# Patient Record
Sex: MALE | Race: OTHER | URBAN - METROPOLITAN AREA
[De-identification: names, ages, dates, MRNs, and addresses within clinical notes are randomized per-mention and may not be internally consistent; named-entity substitution may affect disease eponyms.]

---

## 2021-02-19 ENCOUNTER — OFFICE VISIT (OUTPATIENT)
Dept: URBAN - METROPOLITAN AREA CLINIC 25 | Facility: CLINIC | Age: 36
End: 2021-02-19
Payer: COMMERCIAL

## 2021-02-19 ENCOUNTER — WEB ENCOUNTER (OUTPATIENT)
Dept: URBAN - METROPOLITAN AREA CLINIC 25 | Facility: CLINIC | Age: 36
End: 2021-02-19

## 2021-02-19 ENCOUNTER — LAB OUTSIDE AN ENCOUNTER (OUTPATIENT)
Dept: URBAN - METROPOLITAN AREA CLINIC 25 | Facility: CLINIC | Age: 36
End: 2021-02-19

## 2021-02-19 VITALS
TEMPERATURE: 96.4 F | SYSTOLIC BLOOD PRESSURE: 106 MMHG | DIASTOLIC BLOOD PRESSURE: 71 MMHG | HEIGHT: 66 IN | HEART RATE: 69 BPM | BODY MASS INDEX: 22.28 KG/M2 | WEIGHT: 138.6 LBS

## 2021-02-19 DIAGNOSIS — R10.13 DYSPEPSIA: ICD-10-CM

## 2021-02-19 DIAGNOSIS — R10.10 UPPER ABDOMINAL PAIN: ICD-10-CM

## 2021-02-19 PROCEDURE — 99204 OFFICE O/P NEW MOD 45 MIN: CPT | Performed by: INTERNAL MEDICINE

## 2021-02-19 PROCEDURE — G9903 PT SCRN TBCO ID AS NON USER: HCPCS | Performed by: INTERNAL MEDICINE

## 2021-02-19 PROCEDURE — G8483 FLU IMM NO ADMIN DOC REA: HCPCS | Performed by: INTERNAL MEDICINE

## 2021-02-19 PROCEDURE — G8427 DOCREV CUR MEDS BY ELIG CLIN: HCPCS | Performed by: INTERNAL MEDICINE

## 2021-02-19 PROCEDURE — G8420 CALC BMI NORM PARAMETERS: HCPCS | Performed by: INTERNAL MEDICINE

## 2021-02-19 NOTE — HPI-TODAY'S VISIT:
feb 2021:   Chronic gerd. symptoms started at age 17. has seen GI before.EGD in 2013 - gastritis.  EGD bravo 2013 - normal demeester score ( done on PPI)  between 5 am and 8 am - lower chest feels tigher, upper abdominal discomfort. better with sitting up. unable to burp. symptoms are mostly when reclinign flat but not when upright.   not using PPI ast present. no heartburn. no dysphagia. stopped PPI because there was no improvement in symptoms.  labs were done with pcp yesterday.

## 2021-02-19 NOTE — HPI-OTHER HISTORIES
2013: dr suero   This is a 28-year-old male without any past medical history who I previously  saw for atypical chest pain.  Patient previously had cardiac and pulmonary workup without any  identifiable etiology.  Patient underwent EGD with Bravo pH probe that showed a pH less than 4  of 4.9 and DeMeester score of 21.4 consistent with reflux disease.  There was a high correlation  of symptom index of chest pain of 75%.  Patient has tried Zantac, Prilosec, Prevacid, and most  recently Dexilant.  With 1 month therapy with Dexilant 60 mg daily, he states that he has noticed  no significant improvement of symptoms.  He continues to experience chest pain characterized as  a pressure pain, which is exacerbated with lying down with occasional burping.  He denies any  shortness of breath, odynophagia, dysphagia or regurgitation.  He has noticed occasional reflux  symptoms which are well controlled on PPI.  Patient reports having regular bowel movement  without any hematemesis, melena, or hematochezia.  Patient reports no fever, chills,  constitutional symptoms of weight loss.

## 2021-02-26 ENCOUNTER — LAB OUTSIDE AN ENCOUNTER (OUTPATIENT)
Dept: URBAN - METROPOLITAN AREA CLINIC 25 | Facility: CLINIC | Age: 36
End: 2021-02-26

## 2021-03-04 ENCOUNTER — TELEPHONE ENCOUNTER (OUTPATIENT)
Dept: URBAN - METROPOLITAN AREA CLINIC 25 | Facility: CLINIC | Age: 36
End: 2021-03-04

## 2021-03-04 RX ORDER — HYOSCYAMINE SULFATE 0.125 MG
1 TABLET AS NEEDED TABLET,DISINTEGRATING ORAL
Qty: 100 | Refills: 0 | OUTPATIENT
Start: 2021-03-04 | End: 2021-04-03

## 2021-03-04 RX ORDER — HYOSCYAMINE SULFATE 0.12 MG/1
1 TABLET AS NEEDED TABLET ORAL
Qty: 100 | Refills: 0 | OUTPATIENT
Start: 2021-03-04 | End: 2021-04-03

## 2021-03-15 ENCOUNTER — OFFICE VISIT (OUTPATIENT)
Dept: URBAN - METROPOLITAN AREA TELEHEALTH 2 | Facility: TELEHEALTH | Age: 36
End: 2021-03-15
Payer: COMMERCIAL

## 2021-03-15 ENCOUNTER — TELEPHONE ENCOUNTER (OUTPATIENT)
Dept: URBAN - METROPOLITAN AREA CLINIC 92 | Facility: CLINIC | Age: 36
End: 2021-03-15

## 2021-03-15 ENCOUNTER — LAB OUTSIDE AN ENCOUNTER (OUTPATIENT)
Dept: URBAN - METROPOLITAN AREA TELEHEALTH 2 | Facility: TELEHEALTH | Age: 36
End: 2021-03-15

## 2021-03-15 DIAGNOSIS — R07.89 CHEST TIGHTNESS: ICD-10-CM

## 2021-03-15 DIAGNOSIS — R10.13 DYSPEPSIA: ICD-10-CM

## 2021-03-15 PROCEDURE — 99214 OFFICE O/P EST MOD 30 MIN: CPT | Performed by: INTERNAL MEDICINE

## 2021-03-15 RX ORDER — HYOSCYAMINE SULFATE 0.125 MG
1 TABLET AS NEEDED TABLET,DISINTEGRATING ORAL
Qty: 100 | Refills: 0 | Status: ACTIVE | COMMUNITY
Start: 2021-03-04 | End: 2021-04-03

## 2021-03-15 RX ORDER — HYOSCYAMINE SULFATE 0.12 MG/1
1 TABLET AS NEEDED TABLET ORAL
Qty: 100 | Refills: 0 | Status: DISCONTINUED | COMMUNITY
Start: 2021-03-04 | End: 2021-04-03

## 2021-03-15 NOTE — HPI-OTHER HISTORIES
feb 2021:  Chronic gerd. symptoms started at age 17. has seen GI before.EGD in 2013 - gastritis.  EGD bravo 2013 - normal demeester score ( done on PPI)  between 5 am and 8 am - lower chest feels tigher, upper abdominal discomfort. better with sitting up. unable to burp. symptoms are mostly when reclining flat but not when upright.   not using PPI ast present. no heartburn. no dysphagia. stopped PPI because there was no improvement in symptoms.  labs were done with pcp yesterday.  2013: dr suero   This is a 28-year-old male without any past medical history who I previously  saw for atypical chest pain.  Patient previously had cardiac and pulmonary workup without any  identifiable etiology.  Patient underwent EGD with Bravo pH probe that showed a pH less than 4  of 4.9 and DeMeester score of 21.4 consistent with reflux disease.  There was a high correlation  of symptom index of chest pain of 75%.  Patient has tried Zantac, Prilosec, Prevacid, and most  recently Dexilant.  With 1 month therapy with Dexilant 60 mg daily, he states that he has noticed  no significant improvement of symptoms.  He continues to experience chest pain characterized as  a pressure pain, which is exacerbated with lying down with occasional burping.  He denies any  shortness of breath, odynophagia, dysphagia or regurgitation.  He has noticed occasional reflux  symptoms which are well controlled on PPI.  Patient reports having regular bowel movement  without any hematemesis, melena, or hematochezia.  Patient reports no fever, chills,  constitutional symptoms of weight loss.

## 2021-03-15 NOTE — HPI-TODAY'S VISIT:
March 2021  CT of the abdomen with contrast revealed no abnormal findings, specifically normal liver, gallbladder, spleen, pancreas, no abdominal lymphadenopathy.  Patient did not report any improvement in symptoms with FD guard. tried levsin SL prn - reduces intensity of symptoms.

## 2021-03-26 ENCOUNTER — ERX REFILL RESPONSE (OUTPATIENT)
Dept: URBAN - METROPOLITAN AREA CLINIC 25 | Facility: CLINIC | Age: 36
End: 2021-03-26

## 2021-03-26 RX ORDER — HYOSCYAMINE SULFATE 0.12 MG/1
1 TABLET AS NEEDED TABLET, ORALLY DISINTEGRATING ORAL
Qty: 90 | Refills: 1

## 2021-04-28 ENCOUNTER — OFFICE VISIT (OUTPATIENT)
Dept: URBAN - METROPOLITAN AREA SURGERY CENTER 16 | Facility: SURGERY CENTER | Age: 36
End: 2021-04-28
Payer: COMMERCIAL

## 2021-04-28 ENCOUNTER — CLAIMS CREATED FROM THE CLAIM WINDOW (OUTPATIENT)
Dept: URBAN - METROPOLITAN AREA CLINIC 4 | Facility: CLINIC | Age: 36
End: 2021-04-28
Payer: COMMERCIAL

## 2021-04-28 DIAGNOSIS — K22.8 OTHER SPECIFIED DISEASES OF ESOPHAGUS: ICD-10-CM

## 2021-04-28 DIAGNOSIS — K21.9 GASTRO-ESOPHAGEAL REFLUX DISEASE WITHOUT ESOPHAGITIS: ICD-10-CM

## 2021-04-28 DIAGNOSIS — K31.89 REACTIVE GASTROPATHY: ICD-10-CM

## 2021-04-28 DIAGNOSIS — K21.9 ACID REFLUX: ICD-10-CM

## 2021-04-28 PROCEDURE — 88312 SPECIAL STAINS GROUP 1: CPT | Performed by: PATHOLOGY

## 2021-04-28 PROCEDURE — 88305 TISSUE EXAM BY PATHOLOGIST: CPT | Performed by: PATHOLOGY

## 2021-04-28 PROCEDURE — 43239 EGD BIOPSY SINGLE/MULTIPLE: CPT | Performed by: INTERNAL MEDICINE

## 2021-04-28 PROCEDURE — G8907 PT DOC NO EVENTS ON DISCHARG: HCPCS | Performed by: INTERNAL MEDICINE

## 2021-04-28 RX ORDER — HYOSCYAMINE SULFATE 0.12 MG/1
1 TABLET AS NEEDED TABLET, ORALLY DISINTEGRATING ORAL
Qty: 90 | Refills: 1 | Status: ACTIVE | COMMUNITY

## 2021-05-05 ENCOUNTER — TELEPHONE ENCOUNTER (OUTPATIENT)
Dept: URBAN - METROPOLITAN AREA CLINIC 92 | Facility: CLINIC | Age: 36
End: 2021-05-05

## 2021-05-05 RX ORDER — AMITRIPTYLINE HYDROCHLORIDE 10 MG/1
1 TABLET AT BEDTIME TABLET, FILM COATED ORAL ONCE A DAY
Qty: 30 | OUTPATIENT
Start: 2021-05-07

## 2021-05-18 ENCOUNTER — TELEPHONE ENCOUNTER (OUTPATIENT)
Dept: URBAN - METROPOLITAN AREA CLINIC 25 | Facility: CLINIC | Age: 36
End: 2021-05-18

## 2021-05-24 ENCOUNTER — TELEPHONE ENCOUNTER (OUTPATIENT)
Dept: URBAN - METROPOLITAN AREA CLINIC 92 | Facility: CLINIC | Age: 36
End: 2021-05-24

## 2021-05-24 ENCOUNTER — OFFICE VISIT (OUTPATIENT)
Dept: URBAN - METROPOLITAN AREA TELEHEALTH 2 | Facility: TELEHEALTH | Age: 36
End: 2021-05-24
Payer: COMMERCIAL

## 2021-05-24 DIAGNOSIS — R10.13 DYSPEPSIA: ICD-10-CM

## 2021-05-24 DIAGNOSIS — R10.10 UPPER ABDOMINAL PAIN: ICD-10-CM

## 2021-05-24 DIAGNOSIS — R07.89 CHEST TIGHTNESS: ICD-10-CM

## 2021-05-24 PROCEDURE — 99213 OFFICE O/P EST LOW 20 MIN: CPT | Performed by: INTERNAL MEDICINE

## 2021-05-24 RX ORDER — AMITRIPTYLINE HYDROCHLORIDE 10 MG/1
1 TABLET AT BEDTIME TABLET, FILM COATED ORAL ONCE A DAY
Qty: 30 | Status: ACTIVE | COMMUNITY
Start: 2021-05-07

## 2021-05-24 RX ORDER — HYOSCYAMINE SULFATE 0.12 MG/1
1 TABLET AS NEEDED TABLET, ORALLY DISINTEGRATING ORAL
Qty: 90 | Refills: 1 | Status: ON HOLD | COMMUNITY

## 2021-05-24 RX ORDER — AMITRIPTYLINE HYDROCHLORIDE 25 MG/1
1 TABLET AT BEDTIME TABLET, FILM COATED ORAL ONCE A DAY
Qty: 90 | Refills: 2 | OUTPATIENT
Start: 2021-05-24

## 2021-05-24 NOTE — HPI-TODAY'S VISIT:
May 2021 visit:  Patient underwent a EGD in April 2021 which revealed no endoscopic abnormalities, no celiac disease, no H. pylori infection, no eosinophilic esophagitis, mild reflux changes and distal esophageal biopsy.  Started Amitryptyline 10 mg ( 05/10) which seemed to work for first few days but symptoms are not completely resolved.

## 2021-05-24 NOTE — HPI-OTHER HISTORIES
March 2021  CT of the abdomen with contrast revealed no abnormal findings, specifically normal liver, gallbladder, spleen, pancreas, no abdominal lymphadenopathy.  Patient did not report any improvement in symptoms with FD guard. tried levsin SL prn - reduces intensity of symptoms.  feb 2021:  Chronic gerd. symptoms started at age 17. has seen GI before.EGD in 2013 - gastritis.  EGD bravo 2013 - normal demeester score ( done on PPI)  between 5 am and 8 am - lower chest feels tigher, upper abdominal discomfort. better with sitting up. unable to burp. symptoms are mostly when reclining flat but not when upright.   not using PPI ast present. no heartburn. no dysphagia. stopped PPI because there was no improvement in symptoms.  labs were done with pcp yesterday.  2013: dr suero   This is a 28-year-old male without any past medical history who I previously  saw for atypical chest pain.  Patient previously had cardiac and pulmonary workup without any  identifiable etiology.  Patient underwent EGD with Bravo pH probe that showed a pH less than 4  of 4.9 and DeMeester score of 21.4 consistent with reflux disease.  There was a high correlation  of symptom index of chest pain of 75%.  Patient has tried Zantac, Prilosec, Prevacid, and most  recently Dexilant.  With 1 month therapy with Dexilant 60 mg daily, he states that he has noticed  no significant improvement of symptoms.  He continues to experience chest pain characterized as  a pressure pain, which is exacerbated with lying down with occasional burping.  He denies any  shortness of breath, odynophagia, dysphagia or regurgitation.  He has noticed occasional reflux  symptoms which are well controlled on PPI.  Patient reports having regular bowel movement  without any hematemesis, melena, or hematochezia.  Patient reports no fever, chills,  constitutional symptoms of weight loss.

## 2021-07-15 ENCOUNTER — TELEPHONE ENCOUNTER (OUTPATIENT)
Dept: URBAN - METROPOLITAN AREA CLINIC 25 | Facility: CLINIC | Age: 36
End: 2021-07-15

## 2021-07-15 RX ORDER — RABEPRAZOLE SODIUM 20 MG/1
1 TABLET TABLET, DELAYED RELEASE ORAL ONCE A DAY
Qty: 90 | Refills: 1 | OUTPATIENT
Start: 2021-07-15

## 2021-08-27 ENCOUNTER — TELEPHONE ENCOUNTER (OUTPATIENT)
Dept: URBAN - METROPOLITAN AREA CLINIC 92 | Facility: CLINIC | Age: 36
End: 2021-08-27

## 2021-08-27 ENCOUNTER — OFFICE VISIT (OUTPATIENT)
Dept: URBAN - METROPOLITAN AREA TELEHEALTH 2 | Facility: TELEHEALTH | Age: 36
End: 2021-08-27
Payer: COMMERCIAL

## 2021-08-27 DIAGNOSIS — R10.13 DYSPEPSIA: ICD-10-CM

## 2021-08-27 DIAGNOSIS — R10.10 UPPER ABDOMINAL PAIN: ICD-10-CM

## 2021-08-27 DIAGNOSIS — R07.89 CHEST TIGHTNESS: ICD-10-CM

## 2021-08-27 PROCEDURE — 99213 OFFICE O/P EST LOW 20 MIN: CPT | Performed by: INTERNAL MEDICINE

## 2021-08-27 RX ORDER — AMITRIPTYLINE HYDROCHLORIDE 25 MG/1
1 TABLET AT BEDTIME TABLET, FILM COATED ORAL ONCE A DAY
Qty: 90 | Refills: 2 | Status: ON HOLD | COMMUNITY
Start: 2021-05-24

## 2021-08-27 RX ORDER — AMITRIPTYLINE HYDROCHLORIDE 10 MG/1
1 TABLET AT BEDTIME TABLET, FILM COATED ORAL ONCE A DAY
Qty: 30 | Status: ON HOLD | COMMUNITY
Start: 2021-05-07

## 2021-08-27 RX ORDER — RABEPRAZOLE SODIUM 20 MG/1
1 TABLET TABLET, DELAYED RELEASE ORAL ONCE A DAY
Qty: 90 | Refills: 1 | Status: ACTIVE | COMMUNITY
Start: 2021-07-15

## 2021-08-27 RX ORDER — HYOSCYAMINE SULFATE 0.12 MG/1
1 TABLET AS NEEDED TABLET, ORALLY DISINTEGRATING ORAL
Qty: 90 | Refills: 1 | Status: ON HOLD | COMMUNITY

## 2021-08-27 NOTE — HPI-TODAY'S VISIT:
AUG 2021 :  on PPI - rabeprazole X once daily. despite being on it for 4 weeks, using in evening ( not am). Feels like there is gas built up in stomach and belching helps. Seems to think that has trouble belching when in reclined position. air gets stuck when in reclined position.

## 2021-09-22 ENCOUNTER — LAB OUTSIDE AN ENCOUNTER (OUTPATIENT)
Dept: URBAN - METROPOLITAN AREA CLINIC 25 | Facility: CLINIC | Age: 36
End: 2021-09-22

## 2021-09-22 ENCOUNTER — WEB ENCOUNTER (OUTPATIENT)
Dept: URBAN - METROPOLITAN AREA CLINIC 25 | Facility: CLINIC | Age: 36
End: 2021-09-22

## 2021-10-06 ENCOUNTER — LAB OUTSIDE AN ENCOUNTER (OUTPATIENT)
Dept: URBAN - METROPOLITAN AREA CLINIC 25 | Facility: CLINIC | Age: 36
End: 2021-10-06

## 2021-10-25 ENCOUNTER — TELEPHONE ENCOUNTER (OUTPATIENT)
Dept: URBAN - METROPOLITAN AREA CLINIC 92 | Facility: CLINIC | Age: 36
End: 2021-10-25

## 2021-10-25 ENCOUNTER — OFFICE VISIT (OUTPATIENT)
Dept: URBAN - METROPOLITAN AREA CLINIC 25 | Facility: CLINIC | Age: 36
End: 2021-10-25
Payer: COMMERCIAL

## 2021-10-25 DIAGNOSIS — R10.13 DYSPEPSIA: ICD-10-CM

## 2021-10-25 DIAGNOSIS — R12 HEARTBURN: ICD-10-CM

## 2021-10-25 DIAGNOSIS — R07.89 CHEST TIGHTNESS: ICD-10-CM

## 2021-10-25 DIAGNOSIS — R07.81 PLEURITIC CHEST PAIN: ICD-10-CM

## 2021-10-25 PROCEDURE — 99213 OFFICE O/P EST LOW 20 MIN: CPT | Performed by: INTERNAL MEDICINE

## 2021-10-25 RX ORDER — AMITRIPTYLINE HYDROCHLORIDE 25 MG/1
1 TABLET AT BEDTIME TABLET, FILM COATED ORAL ONCE A DAY
Qty: 90 | Refills: 2 | Status: ON HOLD | COMMUNITY
Start: 2021-05-24

## 2021-10-25 RX ORDER — SUCRALFATE 1 G/10ML
10 ML ON AN EMPTY STOMACH SUSPENSION ORAL
Qty: 1200 ML | Refills: 2 | OUTPATIENT
Start: 2021-10-25 | End: 2022-01-22

## 2021-10-25 RX ORDER — HYOSCYAMINE SULFATE 0.12 MG/1
1 TABLET AS NEEDED TABLET, ORALLY DISINTEGRATING ORAL
Qty: 90 | Refills: 1 | Status: ON HOLD | COMMUNITY

## 2021-10-25 RX ORDER — LANSOPRAZOLE 30 MG
1 CAPSULE BEFORE A MEAL CAPSULE,DELAYED RELEASE (ENTERIC COATED) ORAL ONCE A DAY
Qty: 90 CAPSULE | Refills: 0 | OUTPATIENT
Start: 2021-10-25

## 2021-10-25 RX ORDER — RABEPRAZOLE SODIUM 20 MG/1
1 TABLET TABLET, DELAYED RELEASE ORAL ONCE A DAY
Qty: 90 | Refills: 1 | Status: ON HOLD | COMMUNITY
Start: 2021-07-15

## 2021-10-25 RX ORDER — AMITRIPTYLINE HYDROCHLORIDE 10 MG/1
1 TABLET AT BEDTIME TABLET, FILM COATED ORAL ONCE A DAY
Qty: 30 | Status: ON HOLD | COMMUNITY
Start: 2021-05-07

## 2021-10-25 NOTE — HPI-OTHER HISTORIES
AUG 2021 :  on PPI - rabeprazole X once daily. despite being on it for 4 weeks, using in evening ( not am). Feels like there is gas built up in stomach and belching helps. Seems to think that has trouble belching when in reclined position. air gets stuck when in reclined position.  May 2021 visit:  Patient underwent a EGD in April 2021 which revealed no endoscopic abnormalities, no celiac disease, no H. pylori infection, no eosinophilic esophagitis, mild reflux changes and distal esophageal biopsy.  Started Amitryptyline 10 mg ( 05/10) which seemed to work for first few days but symptoms are not completely resolved.  March 2021  CT of the abdomen with contrast revealed no abnormal findings, specifically normal liver, gallbladder, spleen, pancreas, no abdominal lymphadenopathy.  Patient did not report any improvement in symptoms with FD guard. tried levsin SL prn - reduces intensity of symptoms.  feb 2021:  Chronic gerd. symptoms started at age 17. has seen GI before.EGD in 2013 - gastritis.  EGD bravo 2013 - normal demeester score ( done on PPI)  between 5 am and 8 am - lower chest feels tigher, upper abdominal discomfort. better with sitting up. unable to burp. symptoms are mostly when reclining flat but not when upright.   not using PPI ast present. no heartburn. no dysphagia. stopped PPI because there was no improvement in symptoms.  labs were done with pcp yesterday.  2013: dr suero   This is a 28-year-old male without any past medical history who I previously  saw for atypical chest pain.  Patient previously had cardiac and pulmonary workup without any  identifiable etiology.  Patient underwent EGD with Bravo pH probe that showed a pH less than 4  of 4.9 and DeMeester score of 21.4 consistent with reflux disease.  There was a high correlation  of symptom index of chest pain of 75%.  Patient has tried Zantac, Prilosec, Prevacid, and most  recently Dexilant.  With 1 month therapy with Dexilant 60 mg daily, he states that he has noticed  no significant improvement of symptoms.  He continues to experience chest pain characterized as  a pressure pain, which is exacerbated with lying down with occasional burping.  He denies any  shortness of breath, odynophagia, dysphagia or regurgitation.  He has noticed occasional reflux  symptoms which are well controlled on PPI.  Patient reports having regular bowel movement  without any hematemesis, melena, or hematochezia.  Patient reports no fever, chills,  constitutional symptoms of weight loss.

## 2021-10-25 NOTE — HPI-TODAY'S VISIT:
October 2021 visit:  Periodic chest tightness, more with deeper breathing. no typical heartburn. multiple prior PPI formulations have failed.   Upper GI x-ray October 2021 revealed no abnormal findings, no hiatal hernia, normal esophageal peristalsis, no reflux, normal stomach and duodenum.

## 2021-10-26 ENCOUNTER — TELEPHONE ENCOUNTER (OUTPATIENT)
Dept: URBAN - METROPOLITAN AREA CLINIC 92 | Facility: CLINIC | Age: 36
End: 2021-10-26

## 2021-10-26 RX ORDER — SUCRALFATE 1 G/1
1 TABLET - CRUSH AND MIX WITH 2 TEA SPOONS OF WATER TO MAKE A SLURRY TABLET ORAL
Qty: 120 TABLETS | Refills: 2 | OUTPATIENT
Start: 2021-10-26 | End: 2022-01-23

## 2021-11-10 NOTE — PHYSICAL EXAM HENT:
Head, normocephalic, atraumatic, Face, Face within normal limits, Ears, External ears within normal limits, Nose/Nasopharynx, External nose normal appearance, nares patent, Mouth and Throat, Oral cavity appearance normal, Lips, Appearance normal, no nasal discharge What Is The Reason For Today's Visit?: Full Body Skin Examination Additional History: Pt reports no concerns today.

## 2021-11-16 ENCOUNTER — TELEPHONE ENCOUNTER (OUTPATIENT)
Dept: URBAN - METROPOLITAN AREA CLINIC 25 | Facility: CLINIC | Age: 36
End: 2021-11-16

## 2021-11-19 ENCOUNTER — P2P PATIENT RECORD (OUTPATIENT)
Age: 36
End: 2021-11-19

## 2021-12-22 ENCOUNTER — TELEPHONE ENCOUNTER (OUTPATIENT)
Dept: URBAN - METROPOLITAN AREA CLINIC 92 | Facility: CLINIC | Age: 36
End: 2021-12-22

## 2022-01-07 ENCOUNTER — OFFICE VISIT (OUTPATIENT)
Dept: URBAN - METROPOLITAN AREA TELEHEALTH 2 | Facility: TELEHEALTH | Age: 37
End: 2022-01-07
Payer: COMMERCIAL

## 2022-01-07 ENCOUNTER — TELEPHONE ENCOUNTER (OUTPATIENT)
Dept: URBAN - METROPOLITAN AREA CLINIC 92 | Facility: CLINIC | Age: 37
End: 2022-01-07

## 2022-01-07 DIAGNOSIS — R10.13 DYSPEPSIA: ICD-10-CM

## 2022-01-07 DIAGNOSIS — R07.81 PLEURITIC CHEST PAIN: ICD-10-CM

## 2022-01-07 DIAGNOSIS — R07.89 CHEST TIGHTNESS: ICD-10-CM

## 2022-01-07 DIAGNOSIS — R12 HEARTBURN: ICD-10-CM

## 2022-01-07 PROCEDURE — 99213 OFFICE O/P EST LOW 20 MIN: CPT | Performed by: INTERNAL MEDICINE

## 2022-01-07 RX ORDER — SUCRALFATE 1 G/10ML
10 ML ON AN EMPTY STOMACH SUSPENSION ORAL
Qty: 1200 ML | Refills: 2 | COMMUNITY
Start: 2021-10-25 | End: 2022-01-22

## 2022-01-07 RX ORDER — SUCRALFATE 1 G/1
1 TABLET - CRUSH AND MIX WITH 2 TEA SPOONS OF WATER TO MAKE A SLURRY TABLET ORAL
Qty: 120 TABLETS | Refills: 2 | COMMUNITY
Start: 2021-10-26 | End: 2022-01-23

## 2022-01-07 RX ORDER — LANSOPRAZOLE 30 MG
1 CAPSULE BEFORE A MEAL CAPSULE,DELAYED RELEASE (ENTERIC COATED) ORAL ONCE A DAY
Qty: 90 CAPSULE | Refills: 0 | Status: ACTIVE | COMMUNITY
Start: 2021-10-25

## 2022-01-07 NOTE — HPI-OTHER HISTORIES
October 2021 visit:  Periodic chest tightness, more with deeper breathing. no typical heartburn. multiple prior PPI formulations have failed.   Upper GI x-ray October 2021 revealed no abnormal findings, no hiatal hernia, normal esophageal peristalsis, no reflux, normal stomach and duodenum.  AUG 2021 :  on PPI - rabeprazole X once daily. despite being on it for 4 weeks, using in evening ( not am). Feels like there is gas built up in stomach and belching helps. Seems to think that has trouble belching when in reclined position. air gets stuck when in reclined position.  May 2021 visit:  Patient underwent a EGD in April 2021 which revealed no endoscopic abnormalities, no celiac disease, no H. pylori infection, no eosinophilic esophagitis, mild reflux changes and distal esophageal biopsy.  Started Amitryptyline 10 mg ( 05/10) which seemed to work for first few days but symptoms are not completely resolved.  March 2021  CT of the abdomen with contrast revealed no abnormal findings, specifically normal liver, gallbladder, spleen, pancreas, no abdominal lymphadenopathy.  Patient did not report any improvement in symptoms with FD guard. tried levsin SL prn - reduces intensity of symptoms.  feb 2021:  Chronic gerd. symptoms started at age 17. has seen GI before.EGD in 2013 - gastritis.  EGD bravo 2013 - normal demeester score ( done on PPI)  between 5 am and 8 am - lower chest feels tigher, upper abdominal discomfort. better with sitting up. unable to burp. symptoms are mostly when reclining flat but not when upright.   not using PPI ast present. no heartburn. no dysphagia. stopped PPI because there was no improvement in symptoms.  labs were done with pcp yesterday.  2013: dr suero   This is a 28-year-old male without any past medical history who I previously  saw for atypical chest pain.  Patient previously had cardiac and pulmonary workup without any  identifiable etiology.  Patient underwent EGD with Bravo pH probe that showed a pH less than 4  of 4.9 and DeMeester score of 21.4 consistent with reflux disease.  There was a high correlation  of symptom index of chest pain of 75%.  Patient has tried Zantac, Prilosec, Prevacid, and most  recently Dexilant.  With 1 month therapy with Dexilant 60 mg daily, he states that he has noticed  no significant improvement of symptoms.  He continues to experience chest pain characterized as  a pressure pain, which is exacerbated with lying down with occasional burping.  He denies any  shortness of breath, odynophagia, dysphagia or regurgitation.  He has noticed occasional reflux  symptoms which are well controlled on PPI.  Patient reports having regular bowel movement  without any hematemesis, melena, or hematochezia.  Patient reports no fever, chills,  constitutional symptoms of weight loss.

## 2022-01-07 NOTE — HPI-TODAY'S VISIT:
Jan 2022:  on prevacid 30 mg in am, similar symptoms, slightly better. No Heartburn, Dysphagia, Melena, Rectal bleeding, Weight loss, Diarrhea, Constipation, Abdominal pain.

## 2022-01-19 ENCOUNTER — ERX REFILL RESPONSE (OUTPATIENT)
Dept: URBAN - METROPOLITAN AREA CLINIC 25 | Facility: CLINIC | Age: 37
End: 2022-01-19

## 2022-01-19 RX ORDER — LANSOPRAZOLE 30 MG
1 CAPSULE BEFORE A MEAL CAPSULE,DELAYED RELEASE (ENTERIC COATED) ORAL ONCE A DAY
Qty: 90 CAPSULE | Refills: 0 | OUTPATIENT

## 2022-01-19 RX ORDER — LANSOPRAZOLE 30 MG/1
1 CAPSULE BEFORE A MEAL ORALLY ONCE A DAY CAPSULE, DELAYED RELEASE ORAL
Qty: 90 CAPSULE | Refills: 1 | OUTPATIENT

## 2022-02-01 ENCOUNTER — WEB ENCOUNTER (OUTPATIENT)
Dept: URBAN - METROPOLITAN AREA CLINIC 25 | Facility: CLINIC | Age: 37
End: 2022-02-01

## 2022-02-01 RX ORDER — BACLOFEN 5 MG/1
1 TABLET AS NEEDED TABLET ORAL ONCE A DAY
Qty: 30 TABLET | Refills: 0 | OUTPATIENT
Start: 2022-02-01 | End: 2022-03-03

## 2022-04-05 ENCOUNTER — DASHBOARD ENCOUNTERS (OUTPATIENT)
Age: 37
End: 2022-04-05

## 2022-04-06 ENCOUNTER — TELEPHONE ENCOUNTER (OUTPATIENT)
Dept: URBAN - METROPOLITAN AREA CLINIC 92 | Facility: CLINIC | Age: 37
End: 2022-04-06

## 2022-04-06 ENCOUNTER — OFFICE VISIT (OUTPATIENT)
Dept: URBAN - METROPOLITAN AREA TELEHEALTH 2 | Facility: TELEHEALTH | Age: 37
End: 2022-04-06
Payer: COMMERCIAL

## 2022-04-06 DIAGNOSIS — R07.89 CHEST TIGHTNESS: ICD-10-CM

## 2022-04-06 DIAGNOSIS — R12 HEARTBURN: ICD-10-CM

## 2022-04-06 DIAGNOSIS — R07.81 PLEURITIC CHEST PAIN: ICD-10-CM

## 2022-04-06 PROCEDURE — 99214 OFFICE O/P EST MOD 30 MIN: CPT | Performed by: INTERNAL MEDICINE

## 2022-04-06 RX ORDER — LANSOPRAZOLE 30 MG/1
1 CAPSULE BEFORE A MEAL ORALLY ONCE A DAY CAPSULE, DELAYED RELEASE ORAL
Qty: 90 CAPSULE | Refills: 1 | COMMUNITY

## 2022-04-06 RX ORDER — DEXLANSOPRAZOLE 60 MG/1
1 CAPSULE CAPSULE, DELAYED RELEASE ORAL ONCE A DAY
Qty: 90 | Refills: 0 | OUTPATIENT
Start: 2022-04-06

## 2022-04-06 NOTE — HPI-TODAY'S VISIT:
April 2022:  telemed visit. stopped PPI since 4 weeks since was not having any benefit. continues to have sensation of discomfort in diaphragmatic muscles / lower chest tightness. normal PO intake. no heartburn. no dysphagia. no nausea / vomiting.  baclofen didnt help.

## 2022-04-06 NOTE — HPI-OTHER HISTORIES
Jan 2022:  on prevacid 30 mg in am, similar symptoms, slightly better. No Heartburn, Dysphagia, Melena, Rectal bleeding, Weight loss, Diarrhea, Constipation, Abdominal pain.  October 2021 visit:  Periodic chest tightness, more with deeper breathing. no typical heartburn. multiple prior PPI formulations have failed.   Upper GI x-ray October 2021 revealed no abnormal findings, no hiatal hernia, normal esophageal peristalsis, no reflux, normal stomach and duodenum.  AUG 2021 :  on PPI - rabeprazole X once daily. despite being on it for 4 weeks, using in evening ( not am). Feels like there is gas built up in stomach and belching helps. Seems to think that has trouble belching when in reclined position. air gets stuck when in reclined position.  May 2021 visit:  Patient underwent a EGD in April 2021 which revealed no endoscopic abnormalities, no celiac disease, no H. pylori infection, no eosinophilic esophagitis, mild reflux changes and distal esophageal biopsy.  Started Amitryptyline 10 mg ( 05/10) which seemed to work for first few days but symptoms are not completely resolved.  March 2021  CT of the abdomen with contrast revealed no abnormal findings, specifically normal liver, gallbladder, spleen, pancreas, no abdominal lymphadenopathy.  Patient did not report any improvement in symptoms with FD guard. tried levsin SL prn - reduces intensity of symptoms.  feb 2021:  Chronic gerd. symptoms started at age 17. has seen GI before.EGD in 2013 - gastritis.  EGD bravo 2013 - normal demeester score ( done on PPI)  between 5 am and 8 am - lower chest feels tigher, upper abdominal discomfort. better with sitting up. unable to burp. symptoms are mostly when reclining flat but not when upright.   not using PPI ast present. no heartburn. no dysphagia. stopped PPI because there was no improvement in symptoms.  labs were done with pcp yesterday.  2013: dr suero   This is a 28-year-old male without any past medical history who I previously  saw for atypical chest pain.  Patient previously had cardiac and pulmonary workup without any  identifiable etiology.  Patient underwent EGD with Bravo pH probe that showed a pH less than 4  of 4.9 and DeMeester score of 21.4 consistent with reflux disease.  There was a high correlation  of symptom index of chest pain of 75%.  Patient has tried Zantac, Prilosec, Prevacid, and most  recently Dexilant.  With 1 month therapy with Dexilant 60 mg daily, he states that he has noticed  no significant improvement of symptoms.  He continues to experience chest pain characterized as  a pressure pain, which is exacerbated with lying down with occasional burping.  He denies any  shortness of breath, odynophagia, dysphagia or regurgitation.  He has noticed occasional reflux  symptoms which are well controlled on PPI.  Patient reports having regular bowel movement  without any hematemesis, melena, or hematochezia.  Patient reports no fever, chills,  constitutional symptoms of weight loss.

## 2022-06-24 ENCOUNTER — ERX REFILL RESPONSE (OUTPATIENT)
Dept: URBAN - METROPOLITAN AREA CLINIC 25 | Facility: CLINIC | Age: 37
End: 2022-06-24

## 2022-06-24 RX ORDER — DEXLANSOPRAZOLE 60 MG/1
TAKE 1 CAPSULE BY MOUTH ONCE DAILY CAPSULE, DELAYED RELEASE ORAL
Qty: 90 CAPSULE | Refills: 0 | OUTPATIENT

## 2022-06-24 RX ORDER — DEXLANSOPRAZOLE 60 MG/1
1 CAPSULE CAPSULE, DELAYED RELEASE ORAL ONCE A DAY
Qty: 90 | Refills: 0 | OUTPATIENT

## 2022-08-05 ENCOUNTER — LAB OUTSIDE AN ENCOUNTER (OUTPATIENT)
Dept: URBAN - METROPOLITAN AREA CLINIC 92 | Facility: CLINIC | Age: 37
End: 2022-08-05

## 2022-08-05 ENCOUNTER — TELEPHONE ENCOUNTER (OUTPATIENT)
Dept: URBAN - METROPOLITAN AREA CLINIC 92 | Facility: CLINIC | Age: 37
End: 2022-08-05

## 2022-08-05 PROBLEM — 266434009: Status: ACTIVE | Noted: 2022-08-05

## 2023-01-31 ENCOUNTER — CLAIMS CREATED FROM THE CLAIM WINDOW (OUTPATIENT)
Dept: URBAN - METROPOLITAN AREA MEDICAL CENTER 28 | Facility: MEDICAL CENTER | Age: 38
End: 2023-01-31
Payer: COMMERCIAL

## 2023-01-31 DIAGNOSIS — R13.19 CERVICAL DYSPHAGIA: ICD-10-CM

## 2023-01-31 DIAGNOSIS — K21.9 ACID REFLUX: ICD-10-CM

## 2023-01-31 PROCEDURE — 91038 ESOPH IMPED FUNCT TEST > 1HR: CPT | Performed by: INTERNAL MEDICINE

## 2023-02-23 ENCOUNTER — TELEPHONE ENCOUNTER (OUTPATIENT)
Dept: URBAN - METROPOLITAN AREA CLINIC 25 | Facility: CLINIC | Age: 38
End: 2023-02-23

## 2023-02-27 ENCOUNTER — TELEPHONE ENCOUNTER (OUTPATIENT)
Dept: URBAN - METROPOLITAN AREA CLINIC 25 | Facility: CLINIC | Age: 38
End: 2023-02-27